# Patient Record
Sex: FEMALE | URBAN - METROPOLITAN AREA
[De-identification: names, ages, dates, MRNs, and addresses within clinical notes are randomized per-mention and may not be internally consistent; named-entity substitution may affect disease eponyms.]

---

## 2023-01-06 ENCOUNTER — APPOINTMENT (OUTPATIENT)
Dept: URBAN - METROPOLITAN AREA CLINIC 273 | Age: 23
Setting detail: DERMATOLOGY
End: 2023-01-06

## 2023-01-06 DIAGNOSIS — L20.89 OTHER ATOPIC DERMATITIS: ICD-10-CM

## 2023-01-06 PROBLEM — L20.84 INTRINSIC (ALLERGIC) ECZEMA: Status: ACTIVE | Noted: 2023-01-06

## 2023-01-06 PROCEDURE — OTHER MIPS QUALITY: OTHER

## 2023-01-06 PROCEDURE — OTHER COUNSELING: OTHER

## 2023-01-06 PROCEDURE — 99203 OFFICE O/P NEW LOW 30 MIN: CPT

## 2023-01-06 PROCEDURE — OTHER PRESCRIPTION MEDICATION MANAGEMENT: OTHER

## 2023-01-06 PROCEDURE — OTHER PRESCRIPTION: OTHER

## 2023-01-06 PROCEDURE — OTHER SUNSCREEN RECOMMENDATIONS: OTHER

## 2023-01-06 PROCEDURE — OTHER MEDICATION COUNSELING: OTHER

## 2023-01-06 RX ORDER — RUXOLITINIB 15 MG/G
60 CREAM TOPICAL BID
Qty: 60 | Refills: 6 | Status: ERX | COMMUNITY
Start: 2023-01-06

## 2023-01-06 ASSESSMENT — SEVERITY ASSESSMENT 2020: SEVERITY 2020: MILD

## 2023-01-06 ASSESSMENT — ITCH NUMERIC RATING SCALE: HOW SEVERE IS YOUR ITCHING?: 4

## 2023-01-06 ASSESSMENT — BSA RASH: BSA RASH: 10

## 2023-01-06 NOTE — PROCEDURE: PRESCRIPTION MEDICATION MANAGEMENT
Plan: Apply twice daily prn
Detail Level: Zone
Render In Strict Bullet Format?: No
Initiate Treatment: Opzelura

## 2023-01-06 NOTE — PROCEDURE: MEDICATION COUNSELING
Xelestrellaz Pregnancy And Lactation Text: This medication is Pregnancy Category D and is not considered safe during pregnancy.  The risk during breast feeding is also uncertain.

## 2023-01-06 NOTE — HPI: [email or web address] (PATIENT REPORTED)
Where Is Your Eczema Located?: Hands
List Prescription Topical Steroids You Tried (Separate Each Name With A Comma):: Fluocininide

## 2023-01-17 ENCOUNTER — RX ONLY (RX ONLY)
Age: 23
End: 2023-01-17

## 2023-01-17 RX ORDER — RUXOLITINIB 15 MG/G
60 CREAM TOPICAL BID
Qty: 60 | Refills: 6 | Status: ERX

## 2023-01-23 ENCOUNTER — RX ONLY (RX ONLY)
Age: 23
End: 2023-01-23

## 2023-01-23 RX ORDER — RUXOLITINIB 15 MG/G
60 CREAM TOPICAL BID
Qty: 60 | Refills: 6 | Status: ERX